# Patient Record
Sex: MALE | Race: WHITE | NOT HISPANIC OR LATINO | Employment: UNEMPLOYED | URBAN - METROPOLITAN AREA
[De-identification: names, ages, dates, MRNs, and addresses within clinical notes are randomized per-mention and may not be internally consistent; named-entity substitution may affect disease eponyms.]

---

## 2017-02-28 ENCOUNTER — OFFICE VISIT - HEALTHEAST (OUTPATIENT)
Dept: FAMILY MEDICINE | Facility: CLINIC | Age: 22
End: 2017-02-28

## 2017-02-28 DIAGNOSIS — Z28.82 VACCINATION NOT CARRIED OUT BECAUSE OF CAREGIVER REFUSAL: ICD-10-CM

## 2017-02-28 DIAGNOSIS — Z00.00 ANNUAL PHYSICAL EXAM: ICD-10-CM

## 2017-02-28 DIAGNOSIS — F79 UNSPECIFIED INTELLECTUAL DISABILITIES: ICD-10-CM

## 2017-02-28 ASSESSMENT — MIFFLIN-ST. JEOR: SCORE: 1714.74

## 2017-11-08 ENCOUNTER — COMMUNICATION - HEALTHEAST (OUTPATIENT)
Dept: FAMILY MEDICINE | Facility: CLINIC | Age: 22
End: 2017-11-08

## 2017-11-15 ENCOUNTER — COMMUNICATION - HEALTHEAST (OUTPATIENT)
Dept: FAMILY MEDICINE | Facility: CLINIC | Age: 22
End: 2017-11-15

## 2018-03-01 ENCOUNTER — COMMUNICATION - HEALTHEAST (OUTPATIENT)
Dept: FAMILY MEDICINE | Facility: CLINIC | Age: 23
End: 2018-03-01

## 2019-06-18 ENCOUNTER — COMMUNICATION - HEALTHEAST (OUTPATIENT)
Dept: TELEHEALTH | Facility: CLINIC | Age: 24
End: 2019-06-18

## 2019-06-18 ENCOUNTER — OFFICE VISIT - HEALTHEAST (OUTPATIENT)
Dept: FAMILY MEDICINE | Facility: CLINIC | Age: 24
End: 2019-06-18

## 2019-06-18 DIAGNOSIS — F79 UNSPECIFIED INTELLECTUAL DISABILITIES: ICD-10-CM

## 2019-06-18 DIAGNOSIS — Z00.00 WELLNESS EXAMINATION: ICD-10-CM

## 2019-06-18 ASSESSMENT — MIFFLIN-ST. JEOR: SCORE: 1709.92

## 2020-01-30 ENCOUNTER — COMMUNICATION - HEALTHEAST (OUTPATIENT)
Dept: FAMILY MEDICINE | Facility: CLINIC | Age: 25
End: 2020-01-30

## 2020-09-10 ENCOUNTER — OFFICE VISIT - HEALTHEAST (OUTPATIENT)
Dept: FAMILY MEDICINE | Facility: CLINIC | Age: 25
End: 2020-09-10

## 2020-09-10 DIAGNOSIS — Z00.00 ANNUAL PHYSICAL EXAM: ICD-10-CM

## 2020-09-10 DIAGNOSIS — F79 UNSPECIFIED INTELLECTUAL DISABILITIES: ICD-10-CM

## 2020-09-10 DIAGNOSIS — F84.0 AUTISM SPECTRUM DISORDER: ICD-10-CM

## 2020-09-10 ASSESSMENT — MIFFLIN-ST. JEOR: SCORE: 1689.79

## 2020-09-15 ENCOUNTER — COMMUNICATION - HEALTHEAST (OUTPATIENT)
Dept: FAMILY MEDICINE | Facility: CLINIC | Age: 25
End: 2020-09-15

## 2021-03-23 ENCOUNTER — COMMUNICATION - HEALTHEAST (OUTPATIENT)
Dept: FAMILY MEDICINE | Facility: CLINIC | Age: 26
End: 2021-03-23

## 2021-05-25 ENCOUNTER — RECORDS - HEALTHEAST (OUTPATIENT)
Dept: ADMINISTRATIVE | Facility: CLINIC | Age: 26
End: 2021-05-25

## 2021-05-29 NOTE — PROGRESS NOTES
Assessment:      Healthy male exam.      Plan:    1. Intellectual Disabilities  Patient currently attends a group home and is thriving.    2. Wellness examination  Discussed medications, immunizations and medical interventions with mother.  None needed at this time.     All questions answered.     Subjective:      Owen Brantley is a 24 y.o. male who presents for an annual exam.  Very pleasant 24-year-old male with an elective disabilities presents today for wellness exam because of the need for her to be completed by his group home.  His mother is very in tune with his medical needs, she sees a holistic physician, they do not vaccinate, and she monitors and meets his medical needs thoroughly.    Healthy Habits:   Regular Exercise: Yes  Sunscreen Use: Yes  Healthy Diet: Yes  Dental Visits Regularly: Yes  Seat Belt: Yes  Sexually active: No  Monthly Self Testicular Exams:  No  Hemoccults: N/A  Flex Sig: No  Colonoscopy: No  Lipid Profile: No  Glucose Screen: No  Prevention of Osteoporosis: Yes  Last Dexa: No  Guns at Home:  No      Immunization History   Administered Date(s) Administered     DTaP, historic 1996, 1996, 1996, 05/14/1997, 03/02/2001     Hep B, historic 1996, 1996, 1996     HiB, historic,unspecified 1996, 1996, 1996     IPV 1996, 1996, 1996, 04/13/2000     MMR 03/01/1997, 03/02/2001     Td,adult,historic,unspecified 1996     Varicella 05/14/1997     Immunization status: None    No exam data present     No current outpatient medications on file.     No current facility-administered medications for this visit.      Past Medical History:   Diagnosis Date     Intellectual disability     moderate     Microcephaly (H)      Vaccination not carried out because of caregiver refusal      Past Surgical History:   Procedure Laterality Date     TONSILLECTOMY AND ADENOIDECTOMY  age 10     Patient has no known allergies.  Family History  "  Problem Relation Age of Onset     Lung cancer Maternal Grandmother          age 42     Melanoma Paternal Grandfather         metastatic,  at age 72     Social History     Socioeconomic History     Marital status: Single     Spouse name: Not on file     Number of children: Not on file     Years of education: Not on file     Highest education level: Not on file   Occupational History     Occupation: [employer] student   Social Needs     Financial resource strain: Not on file     Food insecurity:     Worry: Not on file     Inability: Not on file     Transportation needs:     Medical: Not on file     Non-medical: Not on file   Tobacco Use     Smoking status: Never Smoker   Substance and Sexual Activity     Alcohol use: No     Drug use: No     Sexual activity: Never   Lifestyle     Physical activity:     Days per week: Not on file     Minutes per session: Not on file     Stress: Not on file   Relationships     Social connections:     Talks on phone: Not on file     Gets together: Not on file     Attends Episcopalian service: Not on file     Active member of club or organization: Not on file     Attends meetings of clubs or organizations: Not on file     Relationship status: Not on file     Intimate partner violence:     Fear of current or ex partner: Not on file     Emotionally abused: Not on file     Physically abused: Not on file     Forced sexual activity: Not on file   Other Topics Concern     Not on file   Social History Narrative     Not on file       Review of Systems  Review of Systems          Objective:     Vitals:    19 0841   BP: 120/82   Pulse: 77   Resp: 16   SpO2: 97%   Weight: 150 lb 9 oz (68.3 kg)   Height: 6' 0.25\" (1.835 m)     Body mass index is 20.28 kg/m .    Physical  Physical Exam     Constitutional:    --Vitals as above  --No acute distress  Eyes-  --Sclera noninjected  --Lids and conjunctiva normal  ENT-  --TMs clear  --Sclera noninjected  --Pharynx not erythematous  Neck-  --Neck " supple with no cervical lymphadenopathy  Lungs-  --Clear to Auscultation  Heart-  --Regular rate and rhythm  Abdomen--  --Soft, non-tender, non-distended  Skin-  --Pink and dry  Psych-  --Alert and oriented  --Normal affect

## 2021-05-30 VITALS — WEIGHT: 152.5 LBS | BODY MASS INDEX: 20.65 KG/M2 | HEIGHT: 72 IN

## 2021-06-03 VITALS — BODY MASS INDEX: 20.39 KG/M2 | WEIGHT: 150.56 LBS | HEIGHT: 72 IN

## 2021-06-04 VITALS
HEART RATE: 90 BPM | BODY MASS INDEX: 19.91 KG/M2 | SYSTOLIC BLOOD PRESSURE: 110 MMHG | WEIGHT: 147 LBS | HEIGHT: 72 IN | OXYGEN SATURATION: 99 % | DIASTOLIC BLOOD PRESSURE: 80 MMHG

## 2021-06-05 NOTE — TELEPHONE ENCOUNTER
Mom dropped off a [employer] jess[employer] rider form in Dr Flood inbox since he did the physical mom is hoping for Friday or Monday at the latest. Please call when ready [phone]

## 2021-06-09 NOTE — PROGRESS NOTES
SUBJECTIVE: Owen Brantley is a 22 y.o.  male who presents today with his mom for an annual physical exam.  He has special needs and has developmental disabilities.  He is now 22 and has aged out of the district program [employer] and is starting at [facility].  They are requesting a full physical and assessment and so they bring me a form to fill out.  He is very healthy and takes no medication.  He does not have any history of seizure or behavioral problems.  He is behind on his immunizations because mom believes he was normal at birth but as a toddler started having obvious problems.  She believes it could be secondary to immunizations.  I did not challenge her on this point.  Mom is very appropriate and Owen is a delightful young man who acts like a perpetual toddler.  He is very loving and obviously has a great relationship with his mom.  His mom has subsequently adopted other special needs children.    OBJECTIVE:   Visit Vitals     /70     Pulse 90     Temp 98  F (36.7  C)     Ht 6' (1.829 m)     Wt 152 lb 8 oz (69.2 kg)     BMI 20.68 kg/m2     General: Well-appearing strapping young gentleman, tall and thin  Please refer to the scanned in annual physical exam for [facility]. for details of this visit and for the physical exam.    ASSESSMENT & PLAN:    1. Annual physical exam     2. Intellectual Disabilities     3. Vaccination Not Carried Out Due To Caregiver Refusal       The original physical form was given to the patient's mom and a copy will be scanned into his chart.  He will see us back in one year's time or sooner on an as-needed basis.    Patient Active Problem List   Diagnosis     Intellectual Disabilities     Vaccination Not Carried Out Due To Caregiver Refusal       No current outpatient prescriptions on file prior to visit.     No current facility-administered medications on file prior to visit.

## 2021-06-11 NOTE — TELEPHONE ENCOUNTER
Vonnie pt mom notified rest of paperwork from 9/10 visit is ready & complete. vonnie will p/u here. Paperwork copied & in chart to be scanned. Original up front for p/u

## 2021-06-11 NOTE — PROGRESS NOTES
Assessment:      Healthy male exam.    1. Annual physical exam  Patient and mother comes with [employer] paperwork, [employer] paperwork, and annual examination paperwork for his medical services.    2. Intellectual Disabilities  Since infant.  Mom believes it is due to immunizations.    3. Autism spectrum disorder  Doing well.  Can communicate needs pretty well.  Mom knows her son and his needs.  She keeps him busy with activities and sports.  He is problem when his mom tells me he had a hat trick!     25 minutes spent together with the patient today, more than 50% spent in counseling and filling out paperwork concerning the above topics.      Plan:       Blood tests: Deferred by patient's mother/guardian and truly not needed.  Discussed healthy lifestyle modifications.  Follow up: Return in about 1 year (around 9/10/2021) for Annual physical.     Subjective:      Owen Brantley is a 25 y.o. male who presents for an annual exam. The patient reports that there is not domestic violence in his life.  Owen enjoys perfect health other than his intellectual disabilities and developmental delay.  He is living at home and gets his care from his mother.  They have a very nice relationship.  She has him busy doing [employer] and other activities.  This may be curtailed with the pandemic unfortunately.  His mother believes his disabilities are secondary to immunizations and therefore refuses any further immunizations since he was an infant.    Healthy Habits:   Regular Exercise: Yes  Sunscreen Use: Yes  Healthy Diet: Yes  Dental Visits Regularly: Yes  Seat Belt: Yes  Sexually active: No  Monthly Self Testicular Exams:  No  Hemoccults: No  Flex Sig: No  Colonoscopy: No  Lipid Profile: Yes  Glucose Screen: Yes  Prevention of Osteoporosis: No  Last Dexa: No  Guns at Home:  Yes  Guns Safety Locks:  Yes and Gun safe/class:  Yes      Immunization History   Administered Date(s) Administered     DTaP, historic  1996, 1996, 1996, 1997, 2001     Hep B, historic 1996, 1996, 1996     HiB, historic,unspecified 1996, 1996, 1996     IPV 1996, 1996, 1996, 2000     MMR 1997, 2001     Td,adult,historic,unspecified 1996     Varicella 1997     Immunization status: up to date and documented, Refuses all immunizations.    No exam data present     Current Outpatient Medications   Medication Sig Dispense Refill     multivitamin therapeutic tablet Take 1 tablet by mouth daily.       No current facility-administered medications for this visit.      Past Medical History:   Diagnosis Date     Intellectual disability     moderate     Microcephaly (H)      Vaccination not carried out because of caregiver refusal      Past Surgical History:   Procedure Laterality Date     TONSILLECTOMY AND ADENOIDECTOMY  age 10     Patient has no known allergies.  Family History   Problem Relation Age of Onset     Lung cancer Maternal Grandmother          age 42     Melanoma Paternal Grandfather         metastatic,  at age 72     Social History     Socioeconomic History     Marital status: Single     Spouse name: Not on file     Number of children: Not on file     Years of education: Not on file     Highest education level: Not on file   Occupational History     Occupation: [employer] student   Social Needs     Financial resource strain: Not on file     Food insecurity     Worry: Not on file     Inability: Not on file     Transportation needs     Medical: Not on file     Non-medical: Not on file   Tobacco Use     Smoking status: Never Smoker     Smokeless tobacco: Never Used     Tobacco comment: no exp   Substance and Sexual Activity     Alcohol use: No     Drug use: No     Sexual activity: Never   Lifestyle     Physical activity     Days per week: Not on file     Minutes per session: Not on file     Stress: Not on file   Relationships      Social connections     Talks on phone: Not on file     Gets together: Not on file     Attends Oriental orthodox service: Not on file     Active member of club or organization: Not on file     Attends meetings of clubs or organizations: Not on file     Relationship status: Not on file     Intimate partner violence     Fear of current or ex partner: Not on file     Emotionally abused: Not on file     Physically abused: Not on file     Forced sexual activity: Not on file   Other Topics Concern     Not on file   Social History Narrative     Not on file       Review of Systems  Review of Systems   General ROS: negative  Psychological ROS: negative  Ophthalmic ROS: negative  ENT ROS: negative  Allergy and Immunology ROS: negative  Hematological and Lymphatic ROS: negative  Endocrine ROS: negative  Respiratory ROS: negative  Cardiovascular ROS: negative  Gastrointestinal ROS: negative  Genito-Urinary ROS: negative  Musculoskeletal ROS: negative  Neurological ROS: positive for - impaired coordination/balance, speech problems and intellectual disability  Dermatological ROS: negative            Objective:     Vitals:    09/10/20 1149   BP: 110/80   Pulse: 90   SpO2: 99%   Weight: 147 lb (66.7 kg)   Height: 6' (1.829 m)     Body mass index is 19.94 kg/m .    Physical  Physical Exam   General appearance - alert, well appearing, and in no distress and normal appearing weight  Mental status - alert, oriented to person, place, and time, affect appropriate to mood, inappropriate safety awareness  Eyes - pupils equal and reactive, extraocular eye movements intact  Ears - bilateral TM's and external ear canals normal  Nose - normal and patent, no erythema, discharge or polyps  Mouth - mucous membranes moist, pharynx normal without lesions  Neck - supple, no significant adenopathy, thyroid exam: thyroid is normal in size without nodules or tenderness  Lymphatics - no palpable lymphadenopathy, no hepatosplenomegaly  Chest - clear to  auscultation, no wheezes, rales or rhonchi, symmetric air entry  Heart - normal rate and regular rhythm, S1 and S2 normal, no murmurs noted  Abdomen - soft, nontender, nondistended, no masses or organomegaly  Genitalia- not examined  Back exam - full range of motion, no tenderness, palpable spasm or pain on motion  Neurological - cranial nerves II through XII intact, DTR's normal and symmetric, abnormal neurological exam unchanged from prior examinations  Musculoskeletal - no joint tenderness, deformity or swelling  Extremities - peripheral pulses normal, no pedal edema, no clubbing or cyanosis  Skin - normal coloration and turgor, no rashes, no suspicious skin lesions noted

## 2021-06-16 NOTE — TELEPHONE ENCOUNTER
Forms Request  Name of form/paperwork: Other:  [facility]  Have you been seen for this request: N/A  Do we have the form: Yes- IN DR HOUSTON INBOX  When is form needed by: WHEN DONE  How would you like the form returned:   Patient Notified form requests are processed in 3-5 business days: Yes    Okay to leave a detailed message? Yes

## 2021-06-16 NOTE — TELEPHONE ENCOUNTER
Faxed and placed in to be scanned.     Copy was made? Yes. Placed in Dr. Rojo folder. Original placed at  for pt . Left detailed message with this information.     Leni Lee CMA

## 2022-03-14 ENCOUNTER — OFFICE VISIT (OUTPATIENT)
Dept: FAMILY MEDICINE | Facility: CLINIC | Age: 27
End: 2022-03-14
Payer: COMMERCIAL

## 2022-03-14 ENCOUNTER — TELEPHONE (OUTPATIENT)
Dept: FAMILY MEDICINE | Facility: CLINIC | Age: 27
End: 2022-03-14

## 2022-03-14 VITALS
HEART RATE: 78 BPM | HEIGHT: 72 IN | DIASTOLIC BLOOD PRESSURE: 80 MMHG | WEIGHT: 153 LBS | BODY MASS INDEX: 20.72 KG/M2 | SYSTOLIC BLOOD PRESSURE: 120 MMHG | OXYGEN SATURATION: 98 %

## 2022-03-14 DIAGNOSIS — F84.0 AUTISM SPECTRUM DISORDER: ICD-10-CM

## 2022-03-14 DIAGNOSIS — F79 UNSPECIFIED INTELLECTUAL DISABILITIES: ICD-10-CM

## 2022-03-14 DIAGNOSIS — Z28.82 VACCINATION NOT CARRIED OUT BECAUSE OF CAREGIVER REFUSAL: ICD-10-CM

## 2022-03-14 DIAGNOSIS — Z00.00 ROUTINE GENERAL MEDICAL EXAMINATION AT A HEALTH CARE FACILITY: Primary | ICD-10-CM

## 2022-03-14 PROCEDURE — 99395 PREV VISIT EST AGE 18-39: CPT | Performed by: FAMILY MEDICINE

## 2022-03-14 NOTE — TELEPHONE ENCOUNTER
Page 3 of forms from today's visit was not signed.  Can we get that signed and sent to patient's mother Amarilys?  Please call [phone] and let her know when she can pick it up or if it can be faxed.

## 2022-03-14 NOTE — PROGRESS NOTES
SUBJECTIVE:   CC: Owen Brantley is an 27 year old male who presents for preventative health visit.       Patient has been advised of split billing requirements and indicates understanding: Yes  Healthy Habits:     Getting at least 3 servings of Calcium per day:  Yes    Bi-annual eye exam:  NO    Dental care twice a year:  NO    Sleep apnea or symptoms of sleep apnea:  None    Diet:  Vegetarian/vegan, Gluten-free/reduced and Other    Frequency of exercise:  2-3 days/week    Duration of exercise:  15-30 minutes    Taking medications regularly:  Not Applicable    Medication side effects:  Not applicable    PHQ-2 Total Score: 0    Additional concerns today:  No      Today's PHQ-2 Score:   PHQ-2 ( 1999 Pf[employer]) 3/14/2022   Q1: Little interest or pleasure in doing things 0   Q2: Feeling down, depressed or hopeless 0   PHQ-2 Score 0   Q1: Little interest or pleasure in doing things Not at all   Q2: Feeling down, depressed or hopeless Not at all   PHQ-2 Score 0       Abuse: Current or Past(Physical, Sexual or Emotional)- No  Do you feel safe in your environment? No    Have you ever done Advance Care Planning? (For example, a Health Directive, POLST, or a discussion with a medical provider or your loved ones about your wishes): Yes, patient states has an Advance Care Planning document and will bring a copy to the clinic.    Social History     Tobacco Use     Smoking status: Never Smoker     Smokeless tobacco: Never Used     Tobacco comment: no exp   Substance Use Topics     Alcohol use: No     If you drink alcohol do you typically have >3 drinks per day or >7 drinks per week? No    Alcohol Use 3/14/2022   Prescreen: >3 drinks/day or >7 drinks/week? Not Applicable       Last PSA: No results found for: PSA    Reviewed orders with patient. Reviewed health maintenance and updated orders accordingly - Yes  No labs were done today.    Reviewed and updated as needed this visit by clinical staff   Tobacco  Allergies  Meds               Reviewed and updated as needed this visit by Provider                 Past Medical History:   Diagnosis Date     Intellectual disability     moderate     Microcephaly (H)      Vaccination not carried out because of caregiver refusal       Past Surgical History:   Procedure Laterality Date     TONSILLECTOMY & ADENOIDECTOMY  age 10       Review of Systems  CONSTITUTIONAL: NEGATIVE for fever, chills, change in weight  INTEGUMENTARY/SKIN: NEGATIVE for worrisome rashes, moles or lesions  EYES: NEGATIVE for vision changes or irritation  ENT: NEGATIVE for ear, mouth and throat problems  RESP: NEGATIVE for significant cough or SOB  CV: NEGATIVE for chest pain, palpitations or peripheral edema  GI: NEGATIVE for nausea, abdominal pain, heartburn, or change in bowel habits   male: negative for dysuria, hematuria, decreased urinary stream, erectile dysfunction, urethral discharge  MUSCULOSKELETAL: NEGATIVE for significant arthralgias or myalgia  NEURO: NEGATIVE for weakness, dizziness or paresthesias  PSYCHIATRIC: NEGATIVE for changes in mood or affect    OBJECTIVE:   /80   Pulse 78   Ht 1.829 m (6')   Wt 69.4 kg (153 lb)   SpO2 98%   BMI 20.75 kg/m      Physical Exam  Physical Examination: General appearance - alert, well appearing, and in no distress and normal appearing weight  Mental status -moderately developmentally, intellectually disabled, sweet disposition  Eyes - pupils equal and reactive, extraocular eye movements intact  Ears - bilateral TM's and external ear canals normal  Nose - normal and patent, no erythema, discharge   Mouth - not examined and Covered by mask  Neck - supple, no significant adenopathy, thyroid exam: thyroid is normal in size without nodules or tenderness  Lymphatics - no palpable lymphadenopathy, no hepatosplenomegaly  Chest - clear to auscultation, no wheezes, rales or rhonchi, symmetric air entry  Heart - normal rate and regular rhythm, S1 and S2 normal, no murmurs  noted  Abdomen - soft, nontender, nondistended, no masses or organomegaly   Male -not examined  Back exam - full range of motion, no tenderness, palpable spasm or pain on motion  Neurological - neck supple without rigidity, cranial nerves II through XII intact, DTR's normal and symmetric, moderately hypotonic  Musculoskeletal - no joint tenderness, deformity or swelling  Extremities - peripheral pulses normal, no pedal edema, no clubbing or cyanosis  Skin - normal coloration and turgor, no rashes, no suspicious skin lesions noted        Labs reviewed in Epic  none     ASSESSMENT/PLAN:     Routine general medical examination at a health care facility  Exam paperwork filled out for med services.  Copies given to mom, faxed as needed and sent to be scanned.    Autism spectrum disorder  Living at home and getting services through the [facility] and other sources.  Well cared for.    Intellectual Disabilities  Vulnerable adult    Vaccination Not Carried Out Due To Caregiver Refusal  Mom has not vaccinated Owen, he does not do any vaccines.    He can follow-up in 1-2 years as needed.    Patient has been advised of split billing requirements and indicates understanding: Yes    COUNSELING:   Reviewed preventive health counseling, as reflected in patient instructions    Estimated body mass index is 20.75 kg/m  as calculated from the following:    Height as of this encounter: 1.829 m (6').    Weight as of this encounter: 69.4 kg (153 lb).     Mom reports that he has never smoked. He has never used smokeless tobacco.      Counseling Resources:  ATP IV Guidelines  Pooled Cohorts Equation Calculator  FRAX Risk Assessment  ICSI Preventive Guidelines  Dietary Guidelines for Americans, 2010  USDA's MyPlate  ASA Prophylaxis  Lung CA Screening    Kymberly Rojo MD  [facility]

## 2022-03-18 PROBLEM — F84.0 AUTISM SPECTRUM DISORDER: Status: ACTIVE | Noted: 2022-03-18

## 2023-05-22 ENCOUNTER — OFFICE VISIT (OUTPATIENT)
Dept: FAMILY MEDICINE | Facility: CLINIC | Age: 28
End: 2023-05-22
Payer: COMMERCIAL

## 2023-05-22 VITALS
SYSTOLIC BLOOD PRESSURE: 118 MMHG | RESPIRATION RATE: 18 BRPM | DIASTOLIC BLOOD PRESSURE: 80 MMHG | WEIGHT: 157 LBS | HEART RATE: 97 BPM | HEIGHT: 72 IN | TEMPERATURE: 98 F | OXYGEN SATURATION: 97 % | BODY MASS INDEX: 21.26 KG/M2

## 2023-05-22 DIAGNOSIS — Z28.82 VACCINATION NOT CARRIED OUT BECAUSE OF CAREGIVER REFUSAL: ICD-10-CM

## 2023-05-22 DIAGNOSIS — Z00.00 ROUTINE GENERAL MEDICAL EXAMINATION AT A HEALTH CARE FACILITY: Primary | ICD-10-CM

## 2023-05-22 DIAGNOSIS — F84.0 AUTISM SPECTRUM DISORDER: ICD-10-CM

## 2023-05-22 DIAGNOSIS — F79 UNSPECIFIED INTELLECTUAL DISABILITIES: ICD-10-CM

## 2023-05-22 PROCEDURE — 99395 PREV VISIT EST AGE 18-39: CPT | Performed by: FAMILY MEDICINE

## 2023-05-22 ASSESSMENT — ENCOUNTER SYMPTOMS
PARESTHESIAS: 0
DYSURIA: 0
ARTHRALGIAS: 0
EYE PAIN: 0
CONSTIPATION: 0
HEARTBURN: 0
FREQUENCY: 0
SORE THROAT: 0
HEADACHES: 0
ABDOMINAL PAIN: 0
COUGH: 0
NAUSEA: 0
PALPITATIONS: 0
HEMATOCHEZIA: 0
WEAKNESS: 0
JOINT SWELLING: 0
FEVER: 0
NERVOUS/ANXIOUS: 0
DIARRHEA: 0
DIZZINESS: 0
MYALGIAS: 0
HEMATURIA: 0
CHILLS: 0
SHORTNESS OF BREATH: 0

## 2023-05-22 ASSESSMENT — PAIN SCALES - GENERAL: PAINLEVEL: NO PAIN (0)

## 2023-05-22 NOTE — PROGRESS NOTES
SUBJECTIVE:   CC: Owen is an 28 year old who presents for preventative health visit.   I have seen this patient 3 different times prior to today.  He is 28 years old now and has autism, with very limited functioning and is living at home and getting services.  His family is planning to move to [place] and is working to buy land.  Their plan is to host families with disabled children/adults at their air [place]B on this land.  He appears very happy and is very loving towards his mother.      5/22/2023    12:25 PM   Additional Questions   Roomed by tamica barclay cma   Accompanied by mom   Patient has been advised of split billing requirements and indicates understanding: Yes  Healthy Habits:     Getting at least 3 servings of Calcium per day:  Yes    Bi-annual eye exam:  Yes    Dental care twice a year:  Yes    Sleep apnea or symptoms of sleep apnea:  None    Diet:  Regular (no restrictions)    Frequency of exercise:  6-7 days/week    Duration of exercise:  Greater than 60 minutes    Taking medications regularly:  Not Applicable    Medication side effects:  Not applicable    PHQ-2 Total Score: 0    Additional concerns today:  No        Ability to successfully perform activities of daily living: No, needs assistance with: all self care  Home safety:  none identified   Hearing impairment: NO, N/A    Today's PHQ-2 Score:       5/22/2023    12:26 PM   PHQ-2 ( 1999 [employer])   Q1: Little interest or pleasure in doing things 0   Q2: Feeling down, depressed or hopeless 0   PHQ-2 Score 0   Q1: Little interest or pleasure in doing things Not at all   Q2: Feeling down, depressed or hopeless Not at all   PHQ-2 Score 0           Social History     Tobacco Use     Smoking status: Never     Smokeless tobacco: Never     Tobacco comments:     no exp   Vaping Use     Vaping status: Never Used   Substance Use Topics     Alcohol use: No             5/22/2023    12:26 PM   Alcohol Use   Prescreen: >3 drinks/day or >7 drinks/week? Not Applicable        Last PSA: No results found for: PSA    Reviewed orders with patient. Reviewed health maintenance and updated orders accordingly - Yes  Labs reviewed in EPIC  BP Readings from Last 3 Encounters:   05/22/23 118/80   03/14/22 120/80   09/10/20 110/80    Wt Readings from Last 3 Encounters:   05/22/23 71.2 kg (157 lb)   03/14/22 69.4 kg (153 lb)   09/10/20 66.7 kg (147 lb)               Reviewed and updated as needed this visit by clinical staff   Tobacco  Allergies  Meds              Reviewed and updated as needed this visit by Provider                 Past Medical History:   Diagnosis Date     Intellectual disability     moderate     Microcephaly (H)      Vaccination not carried out because of caregiver refusal       Past Surgical History:   Procedure Laterality Date     TONSILLECTOMY & ADENOIDECTOMY  age 10       Review of Systems   Constitutional: Negative for chills and fever.   HENT: Negative for congestion, ear pain, hearing loss and sore throat.    Eyes: Negative for pain and visual disturbance.   Respiratory: Negative for cough and shortness of breath.    Cardiovascular: Negative for chest pain, palpitations and peripheral edema.   Gastrointestinal: Negative for abdominal pain, constipation, diarrhea, heartburn, hematochezia and nausea.   Genitourinary: Negative for dysuria, frequency, genital sores, hematuria, impotence, penile discharge and urgency.   Musculoskeletal: Negative for arthralgias, joint swelling and myalgias.   Skin: Negative for rash.   Neurological: Negative for dizziness, weakness, headaches and paresthesias.   Psychiatric/Behavioral: Negative for mood changes. The patient is not nervous/anxious.        OBJECTIVE:   /80   Pulse 97   Temp 98  F (36.7  C)   Resp 18   Ht 1.829 m (6')   Wt 71.2 kg (157 lb)   SpO2 97%   BMI 21.29 kg/m      Physical Exam  General appearance - alert, well appearing, and in no distress and normal appearing weight  Mental status - normal mood,  loving towards mom, poor eye contact, unable to verbalize  Eyes - pupils equal and reactive, extraocular eye movements intact  Ears - bilateral TM's and external ear canals normal  Nose - normal and patent, no erythema, discharge   Mouth - mucous membranes moist, pharynx normal without lesions  Neck - supple, no significant adenopathy, thyroid exam: thyroid is normal in size without nodules or tenderness  Lymphatics - no cervical lymphadenopathy  Chest - clear to auscultation, no wheezes, rales or rhonchi, symmetric air entry  Heart - normal rate and regular rhythm, no murmurs noted  Abdomen - soft, nontender   Male - not examined  Back exam - full range of motion, no tenderness, palpable spasm or pain on motion  Neurological - alert, oriented x 2, paucity and difficult to understand speech, no focal findings or movement disorder noted  Musculoskeletal - no joint tenderness, deformity or swelling  Extremities - peripheral pulses normal, no pedal edema, no clubbing or cyanosis  Skin - normal coloration and turgor, no rashes, no suspicious skin lesions noted      Diagnostic Test Results:  none     ASSESSMENT/PLAN:     Routine general medical examination at a health care facility  Annual physical exam form filled out for [facility] services.  Will be faxed to the facility at [phone] and copy will be mailed to mom.  Will be scanned into chart.    Autism spectrum disorder  Living at home and getting services through the [facility] and other sources.  Patient is well cared for.    Intellectual Disabilities  Would be considered a vulnerable adult as he is unable to take care of of his own ADLs.    Vaccination Not Carried Out Due To Caregiver Refusal  Family has not vaccinated Owen since 2001.    I suspect that this patient will not be seen again as his family has been making plans to move to [place].        Patient has been advised of split billing requirements and indicates understanding:  Yes      COUNSELING:   Reviewed preventive health counseling, as reflected in patient instructions        He reports that he has never smoked. He has never used smokeless tobacco.        Kymberly Rojo MD  [facility]

## 2023-11-09 ENCOUNTER — TELEPHONE (OUTPATIENT)
Dept: FAMILY MEDICINE | Facility: CLINIC | Age: 28
End: 2023-11-09
Payer: COMMERCIAL

## 2023-11-09 NOTE — TELEPHONE ENCOUNTER
Forms/Letter Request    Type of form/letter:  to explain needs for services    Have you been seen for this request: No    Do we have the form/letter: Yes: to be written.  They are going to be moving out of [facility] to [place] and in order to get signed up there for any/all services like day program, transportation, therapies, etc,  It needs to just be a detailed overview of his health history and why he has the needs he has due to his disabilities    Please call mom Amarilys with any questions        When is form/letter needed by: before they move which will be in about 3 wks    How would you like the form/letter returned:     Patient Notified form requests are processed in 3-5 business days:Yes    Okay to leave a detailed message?: Yes Amarilys [phone]

## 2023-11-09 NOTE — LETTER
November 10, 2023      Owen Brantley  [id number] [address] [id number]        To Whom It May Concern,     Owen Brantley has been a patient of mine since 2017 known to have an intellectual disability when he established care. He was diagnosed between 2-3 yrs old. He had IQ testing performed in 2015 found to have the mental capacity to understand, remember, and follow instructions that include one step activities. When given instructions, they need to be concrete and simple in orientation. He would not tolerate stress and pressure found in an entry-level workplace. On the intellectual measure(WAIS-IV) he obtained an IQ scale of 45, which placed him in the extremely low range for intellectual functioning. He receives multiple services here in [place] that positively impact his life, help him socially and keep him active. Continuing these services would benefit him greatly in [place]. These resources include but are not limited to, [employer], day program, camp;[employer], and [employer].     Please allow my patient to receive these services as he moves to [place].           Sincerely,        Kymberly Rojo MD

## 2023-11-10 NOTE — TELEPHONE ENCOUNTER
Spoke with mom. IQ testing in chart from 2015. Letter prepped and pending provider's review. Call mom when complete.

## 2023-11-10 NOTE — TELEPHONE ENCOUNTER
Nice letter.  Good job. I printed the letter.  I can sign it, if mom would like, before she picks it up.

## 2024-04-22 ENCOUNTER — PATIENT OUTREACH (OUTPATIENT)
Dept: CARE COORDINATION | Facility: CLINIC | Age: 29
End: 2024-04-22
Payer: COMMERCIAL

## 2024-05-06 ENCOUNTER — PATIENT OUTREACH (OUTPATIENT)
Dept: CARE COORDINATION | Facility: CLINIC | Age: 29
End: 2024-05-06
Payer: COMMERCIAL